# Patient Record
(demographics unavailable — no encounter records)

---

## 2025-04-02 NOTE — PHYSICAL EXAM
[Normal Appearance] : normal appearance [Heart Rate And Rhythm] : heart rate and rhythm were normal [] : no respiratory distress [Abdomen Soft] : soft [Abdomen Tenderness] : non-tender [Abdomen Mass (___ Cm)] : no abdominal mass palpated [Not Anxious] : not anxious [de-identified] : PVR=1 ml

## 2025-04-02 NOTE — ASSESSMENT
[FreeTextEntry1] : Zuly is a 23 y.o. female with complex medical history of epilepsy, ADHD, scoliosis, hypothyroidism, amongst other medical issues.  The patient presents to the office today with her father.  Zuly was diagnosed with rhinovirus 2 weeks ago and around Saturday, March 22 the patient developed a seizure in the middle of the night that lasted for about 20 minutes.  The patient was transferred to emergency room with fever 103 F.  The patient was subsequently intubated for 48 hours then successfully extubated after adjustment of medications in the hospital.  The patient was discharged home Saturday, March 29 when parents noted that Zuly is having nocturnal enuresis.  Patient's valproic acid was adjusted in the hospital and currently at 700 mg in the morning and 1500 mg at night.  Depakote levels were done yesterday at [facility] and parents are awaiting results.  The patient is currently on Augmentin that she was discharged from the hospital with 3 more doses remaining.  The patient denies burning or pain in the vaginal area.  Zuly is complaining of pain and pressure in the left leg and is going for a Doppler study right after her appointment with me.  Urine will be sent today for urine analysis and urine culture.  Postvoid residual checked and equals to 1 mL.  I instructed the family to follow patient's symptoms care and to call the office back with any changes.  I will be calling the family if results of urine studies are positive.  I would like the patient to proceed with kidney and bladder ultrasound since Zuly's father has a history of nephrolithiasis.  I hope that anuresis will improve by itself and is just a consequence of higher doses of valproic acid.  Otherwise, the patient will follow-up in the office for urodynamic evaluation.